# Patient Record
Sex: MALE | HISPANIC OR LATINO | URBAN - METROPOLITAN AREA
[De-identification: names, ages, dates, MRNs, and addresses within clinical notes are randomized per-mention and may not be internally consistent; named-entity substitution may affect disease eponyms.]

---

## 2017-09-25 ENCOUNTER — EMERGENCY (EMERGENCY)
Facility: HOSPITAL | Age: 32
LOS: 1 days | Discharge: HOME | End: 2017-09-25
Attending: EMERGENCY MEDICINE | Admitting: EMERGENCY MEDICINE
Payer: MEDICAID

## 2017-09-25 VITALS
OXYGEN SATURATION: 99 % | SYSTOLIC BLOOD PRESSURE: 129 MMHG | RESPIRATION RATE: 18 BRPM | HEART RATE: 86 BPM | DIASTOLIC BLOOD PRESSURE: 96 MMHG | TEMPERATURE: 98 F

## 2017-09-25 PROCEDURE — 99284 EMERGENCY DEPT VISIT MOD MDM: CPT

## 2017-09-25 RX ORDER — KETOROLAC TROMETHAMINE 30 MG/ML
30 SYRINGE (ML) INJECTION ONCE
Qty: 0 | Refills: 0 | Status: DISCONTINUED | OUTPATIENT
Start: 2017-09-25 | End: 2017-09-25

## 2017-09-25 RX ORDER — IBUPROFEN 200 MG
1 TABLET ORAL
Qty: 30 | Refills: 0 | OUTPATIENT
Start: 2017-09-25

## 2017-09-25 RX ORDER — DIAZEPAM 5 MG
1 TABLET ORAL
Qty: 10 | Refills: 0 | OUTPATIENT
Start: 2017-09-25 | End: 2017-09-28

## 2017-09-25 RX ADMIN — Medication 30 MILLIGRAM(S): at 12:21

## 2017-09-25 NOTE — ED PROVIDER NOTE - OBJECTIVE STATEMENT
30 y/o M w/ no significant PMHx presents to ED c/o lower back pain. Reports he had an accident x1year ago and has a known disc issue. States pain has been generally constant however x1wk ago while lifting pain worsened. States he had been going to physical therapy for initial spinal injury over the past year however no longer following a spine specialist. Denies urinary/bowel incontinence, and other complaints. 30 y/o M w/ no significant PMHx presents to ED c/o lower back pain. Reports he had an accident x1year ago and has a known disc issue. States pain has been generally constant however x1wk ago while lifting pain worsened. States he had been going to physical therapy for initial spinal injury over the past year however no longer following a spine specialist. Denies urinary/bowel incontinence, and other complaints. Able to ambulate without problems. Taking ibuprofen with some relief. No numbness, tingling or weakness.

## 2017-09-25 NOTE — ED ADULT TRIAGE NOTE - CHIEF COMPLAINT QUOTE
Patient has c/o lower back pain that is consistent for the past week. He has had this pain for a while but intermittent and dx with disk problems.

## 2017-09-25 NOTE — ED PROVIDER NOTE - MEDICAL DECISION MAKING DETAILS
Back spasms w/ sciatica, no red flags, tx w/ anti-inflammatories, urge pt to f/u w/ back specialist, reassess. Back spasms w/ sciatica, no red flags, tx w/ anti-inflammatories, exercises demonstrated, urged pt to f/u w/ back specialist, reassess.

## 2017-09-25 NOTE — ED PROVIDER NOTE - PLAN OF CARE
Follow up with your primary care provider within 48 hours  Follow up with neurology within 48 hours, referral list provided  Take Motrin 600mg as needed for pain every 8 hours, take with food  Apply warm compress as needed for pain relief  Return to the emergency department with any worsening or concerning symptoms, leg numbness or weakness, incontinence of bowel or bladder, difficulty with walking or anything else concerning.

## 2017-09-25 NOTE — ED PROVIDER NOTE - MUSCULOSKELETAL MINIMAL EXAM
L lower lumbar paraspinous TTP, no midline spinal TTP, strength and sensation intact in BL lower extremities, +straight leg raise BL

## 2017-09-25 NOTE — ED PROVIDER NOTE - CARE PLAN
Principal Discharge DX:	Sciatica of left side Principal Discharge DX:	Sciatica of left side  Instructions for follow-up, activity and diet:	Follow up with your primary care provider within 48 hours  Follow up with neurology within 48 hours, referral list provided  Take Motrin 600mg as needed for pain every 8 hours, take with food  Apply warm compress as needed for pain relief  Return to the emergency department with any worsening or concerning symptoms, leg numbness or weakness, incontinence of bowel or bladder, difficulty with walking or anything else concerning.

## 2017-09-25 NOTE — ED PROVIDER NOTE - PROGRESS NOTE DETAILS
ALLIE Narayanan: Received signout from . Patient with history of slipped disc with nerve pain for one year presenting with worsening pain for one week after lifting heavy objects. Patient has left lumbar paraspinal muscle tenderness and spasm. Plan is pain control with Toradol, and neurology follow up. Pt reassessed, ok with plan for outpatient follow up. Neurology referral list given.

## 2017-10-02 PROBLEM — Z00.00 ENCOUNTER FOR PREVENTIVE HEALTH EXAMINATION: Status: ACTIVE | Noted: 2017-10-02

## 2017-10-23 ENCOUNTER — APPOINTMENT (OUTPATIENT)
Dept: NEUROLOGY | Facility: CLINIC | Age: 32
End: 2017-10-23